# Patient Record
Sex: FEMALE | URBAN - METROPOLITAN AREA
[De-identification: names, ages, dates, MRNs, and addresses within clinical notes are randomized per-mention and may not be internally consistent; named-entity substitution may affect disease eponyms.]

---

## 2023-02-15 ENCOUNTER — TELEPHONE (OUTPATIENT)
Dept: FAMILY PLANNING/WOMEN'S HEALTH CLINIC | Age: 33
End: 2023-02-15

## 2023-02-15 NOTE — TELEPHONE ENCOUNTER
Patient called back and has been screened for E[facility]. An fax via [facility] fax was sent to referring provider about- patient scheduled appt. Date/time. Patient was sent a text message as per her request with appt. Details.  Chip Esposito RN

## 2023-02-15 NOTE — TELEPHONE ENCOUNTER
Received referral documentation from [facility] on 2/10/2023- in re: 1-2 nickel sized fixed lumps on both breasts bilaterally, midly tender. Nipples express scant clear discharge with pressure, no signs od mastitis, no dimpling, no skin changes, no axillary lymphadenopathy. By Debbie Medina NP. This nurse reviewed pt's chart today, patient has not called screening line, nurse telephoned number on order, no answer, left message to call our main office. Patient needs to be screen and offer next available Dx slot appt. If she qualifies.  India Nagy, RN

## 2023-03-09 ENCOUNTER — TELEPHONE (OUTPATIENT)
Dept: FAMILY PLANNING/WOMEN'S HEALTH CLINIC | Age: 33
End: 2023-03-09

## 2023-03-09 NOTE — TELEPHONE ENCOUNTER
Attempted to reach patient to confirm upcoming EWL appointment but call will not go through. Phone number may be disconnected.

## 2023-03-16 ENCOUNTER — OFFICE VISIT (OUTPATIENT)
Dept: FAMILY PLANNING/WOMEN'S HEALTH CLINIC | Age: 33
End: 2023-03-16

## 2023-03-16 ENCOUNTER — HOSPITAL ENCOUNTER (OUTPATIENT)
Dept: MAMMOGRAPHY | Age: 33
Discharge: HOME | End: 2023-03-16

## 2023-03-16 ENCOUNTER — HOSPITAL ENCOUNTER (OUTPATIENT)
Dept: MAMMOGRAPHY | Age: 33
End: 2023-03-16

## 2023-03-16 DIAGNOSIS — N63.0 BREAST MASS: ICD-10-CM

## 2023-03-16 DIAGNOSIS — N63.0 MASS OF BREAST, UNSPECIFIED LATERALITY: Primary | ICD-10-CM

## 2023-03-16 PROCEDURE — 76642 ULTRASOUND BREAST LIMITED: CPT

## 2023-03-16 PROCEDURE — 77062 BREAST TOMOSYNTHESIS BI: CPT

## 2023-03-16 NOTE — PROGRESS NOTES
Assessment/Plan:    Diagnoses and all orders for this visit:    1. Mass of breast, unspecified laterality    Suspect fibrocystic breast disorder but will confirm with dx imaging  Education about the nature of FCB disorder was shared with the pt         [facility], GRACE Raymond expressed understanding of this plan. An AVS was printed and given to the patient.      ----------------------------------------------------------------------    Chief Complaint   Patient presents with    Breast Problem       History of Present Illness:  33 yo referred to [facility] from [facility] for maxime breast masses and clear nipple discharge   5 yo child, breast fed for 2 years  Having normal monthly periods. She has the norplant device  The breast masses have been present for some time now, occasionally tender, she has not noted a temporal relationship with her period  In a safe relationship with her partner, no risk for DV       No past medical history on file. No Known Allergies    Social History     Tobacco Use    Smoking status: Never    Smokeless tobacco: Never       No family history on file. Physical Exam:     Visit Vitals  LMP 2023 (Exact Date)       A&Ox3  WDWN NAD  Respirations normal and non labored  Breast exam- maxime neg for dimpling, retractions, skin color changes, nipple changes.  She has diffuse fibrocystic changes/ rubbery masses but two more prominent were left breast 12 oclock and right breast 3 oclock

## 2023-03-16 NOTE — PROGRESS NOTES
[facility] HISTORY QUESTIONNAIRE       No Yes Comments   Has a doctor ever seen or felt anything wrong with your breast? []                                  [x]                                  Referred by [facility] for maxime. Brest masses and maxime. Clear nipple discharge with expression   Have you ever had a breast biopsy? [x]                                  []                                          When and where was last mammogram performed?  none    Have you ever been told that there was a problem on your mammogram?   No Yes Comments   []                                  []                                  N/a     Do you have breast implants? No Yes Comments   [x]                                  []                                       When was your last Pap test performed? 03/2022 in [facility], negative per patient - pt is underage for cervical screening recommended to follow up with PCP at [facility] when she is due for it. Have you ever had an abnormal Pap test?   No Yes Comments   [x]                                  []                                       Have you had a hysterectomy? No Yes Comments (why)   [x]                                  []                                       Have you been through menopause? No Yes Date of LMP   [x]                                  []                                  2/27/2023     Did your mother take LEONIE? No Yes Unknown   [x]                                  []                                       Do you have a history of HIV exposure? No Yes    [x]                                  []                                       Have you ever been diagnosed with any type of Cancer   No Yes Comments (type,when,where,type of treatment   [x]                                  []                                          Has a family member been diagnosed with breast or ovarian cancer?    No Yes Comments (which family members, and type   [x] []                                       Are you taking hormone replacement therapy (HRT)     No Yes Comments   [x]                                  []                                       How many times have you been pregnant? 1        Number of live births ? 1    Are you experiencing any of the following? No Yes Comments   Nipple Discharge []                                  [x]                                     Breast Lump/Masses []                                  [x]                                     Breast Skin Changes [x]                                  []                                          No Yes Comments   Vaginal Discharge [x]                                  []                                     Abnormal/unusual vaginal bleeding [x]                                  []                                         Are you experiencing any other health problems? None reported- pt goes to [facility] for health care needs      Age at first period? 14  Age at first birth?  24

## 2023-03-17 ENCOUNTER — CLINICAL SUPPORT (OUTPATIENT)
Dept: FAMILY PLANNING/WOMEN'S HEALTH CLINIC | Age: 33
End: 2023-03-17

## 2023-03-17 DIAGNOSIS — Z71.89 COUNSELING AND COORDINATION OF CARE: Primary | ICD-10-CM

## 2023-03-17 NOTE — PROGRESS NOTES
Encounter completed in re: finalizing patient's referral in to the [address] [place] [address] [facility] program.   Patient referred to us for diagnostic imaging for abnormal breast symptoms by BRO Lal at the [facility]. Pt enrolled in [address] [place] [address] [facility] program and was sent to have diagnostic images on 3/16/2023 - resulted negative and to resume imaging at age 35. The provider was routed the imaging report and recommendation and pt was told of the results at the time of the appt.  Patient has been billed in . Pradeep Hernandes, RN